# Patient Record
Sex: MALE | Race: WHITE
[De-identification: names, ages, dates, MRNs, and addresses within clinical notes are randomized per-mention and may not be internally consistent; named-entity substitution may affect disease eponyms.]

---

## 2020-07-28 ENCOUNTER — HOSPITAL ENCOUNTER (INPATIENT)
Dept: HOSPITAL 96 - M.ERS | Age: 35
LOS: 3 days | Discharge: HOME | DRG: 433 | End: 2020-07-31
Attending: INTERNAL MEDICINE | Admitting: INTERNAL MEDICINE
Payer: COMMERCIAL

## 2020-07-28 VITALS — WEIGHT: 204 LBS | BODY MASS INDEX: 26.18 KG/M2 | HEIGHT: 74.02 IN

## 2020-07-28 VITALS — DIASTOLIC BLOOD PRESSURE: 85 MMHG | SYSTOLIC BLOOD PRESSURE: 126 MMHG

## 2020-07-28 VITALS — DIASTOLIC BLOOD PRESSURE: 93 MMHG | SYSTOLIC BLOOD PRESSURE: 144 MMHG

## 2020-07-28 VITALS — SYSTOLIC BLOOD PRESSURE: 180 MMHG | DIASTOLIC BLOOD PRESSURE: 99 MMHG

## 2020-07-28 VITALS — SYSTOLIC BLOOD PRESSURE: 139 MMHG | DIASTOLIC BLOOD PRESSURE: 79 MMHG

## 2020-07-28 DIAGNOSIS — K70.31: ICD-10-CM

## 2020-07-28 DIAGNOSIS — K21.0: ICD-10-CM

## 2020-07-28 DIAGNOSIS — I85.00: ICD-10-CM

## 2020-07-28 DIAGNOSIS — F32.9: ICD-10-CM

## 2020-07-28 DIAGNOSIS — F41.9: ICD-10-CM

## 2020-07-28 DIAGNOSIS — Z79.899: ICD-10-CM

## 2020-07-28 DIAGNOSIS — K44.9: ICD-10-CM

## 2020-07-28 DIAGNOSIS — Z20.828: ICD-10-CM

## 2020-07-28 DIAGNOSIS — F17.210: ICD-10-CM

## 2020-07-28 DIAGNOSIS — F20.9: ICD-10-CM

## 2020-07-28 DIAGNOSIS — F43.10: ICD-10-CM

## 2020-07-28 DIAGNOSIS — E46: ICD-10-CM

## 2020-07-28 DIAGNOSIS — E87.6: ICD-10-CM

## 2020-07-28 DIAGNOSIS — K31.89: ICD-10-CM

## 2020-07-28 DIAGNOSIS — K70.11: Primary | ICD-10-CM

## 2020-07-28 DIAGNOSIS — K76.6: ICD-10-CM

## 2020-07-28 DIAGNOSIS — R16.2: ICD-10-CM

## 2020-07-28 DIAGNOSIS — E03.9: ICD-10-CM

## 2020-07-28 DIAGNOSIS — F10.10: ICD-10-CM

## 2020-07-28 LAB
ABSOLUTE BASOPHILS: 0.2 THOU/UL (ref 0–0.2)
ABSOLUTE EOSINOPHILS: 0.2 THOU/UL (ref 0–0.7)
ABSOLUTE MONOCYTES: 0.5 THOU/UL (ref 0–1.2)
ALBUMIN SERPL-MCNC: 2.3 G/DL (ref 3.4–5)
ALP SERPL-CCNC: 387 U/L (ref 46–116)
ALT SERPL-CCNC: 81 U/L (ref 30–65)
AMMONIA PLAS-SCNC: < 10 UMOL/L (ref 11–32)
ANION GAP SERPL CALC-SCNC: 9 MMOL/L (ref 7–16)
AST SERPL-CCNC: 260 U/L (ref 15–37)
BASOPHILS NFR BLD AUTO: 1.5 %
BENZODIAZ UR-MCNC: POSITIVE UG/L
BILIRUB SERPL-MCNC: 6.7 MG/DL
BILIRUB UR-MCNC: (no result) MG/DL
BUN SERPL-MCNC: 3 MG/DL (ref 7–18)
CALCIUM SERPL-MCNC: 8.4 MG/DL (ref 8.5–10.1)
CALCIUM SERPL-MCNC: 8.4 MG/DL (ref 8.5–10.1)
CHLORIDE SERPL-SCNC: 102 MMOL/L (ref 98–107)
CO2 SERPL-SCNC: 27 MMOL/L (ref 21–32)
COLOR UR: (no result)
CREAT SERPL-MCNC: 0.8 MG/DL (ref 0.6–1.3)
EOSINOPHIL NFR BLD: 1.8 %
GLUCOSE SERPL-MCNC: 163 MG/DL (ref 70–99)
GRANULOCYTES NFR BLD MANUAL: 73.8 %
HCT VFR BLD CALC: 40.6 % (ref 42–52)
HGB BLD-MCNC: 13.9 GM/DL (ref 14–18)
INR PPP: 1.2
KETONES UR STRIP-MCNC: NEGATIVE MG/DL
LYMPHOCYTES # BLD: 2.6 THOU/UL (ref 0.8–5.3)
LYMPHOCYTES NFR BLD AUTO: 19 %
MAGNESIUM SERPL-MCNC: 1.6 MG/DL (ref 1.8–2.4)
MCH RBC QN AUTO: 36.8 PG (ref 26–34)
MCHC RBC AUTO-ENTMCNC: 34.4 G/DL (ref 28–37)
MCV RBC: 107.2 FL (ref 80–100)
MONOCYTES NFR BLD: 3.9 %
MPV: 7.4 FL. (ref 7.2–11.1)
NEUTROPHILS # BLD: 10.1 THOU/UL (ref 1.6–8.1)
NT-PRO BRAIN NAT PEPTIDE: 89 PG/ML (ref ?–300)
NUCLEATED RBCS: 0 /100WBC
PHOSPHATE SERPL-MCNC: 2.4 MG/DL (ref 2.5–4.9)
PLATELET COUNT*: 322 THOU/UL (ref 150–400)
POTASSIUM SERPL-SCNC: 3 MMOL/L (ref 3.5–5.1)
PROT SERPL-MCNC: 6.3 G/DL (ref 6.4–8.2)
PROT UR QL STRIP: NEGATIVE
PROTHROMBIN TIME: 12.7 SECONDS (ref 9.2–11.5)
RBC # BLD AUTO: 3.78 MIL/UL (ref 4.5–6)
RBC # UR STRIP: NEGATIVE /UL
RDW-CV: 16.2 % (ref 10.5–14.5)
SODIUM SERPL-SCNC: 138 MMOL/L (ref 136–145)
SP GR UR STRIP: 1.01 (ref 1–1.03)
URINE CLARITY: CLEAR
URINE GLUCOSE-RANDOM: NEGATIVE
URINE LEUKOCYTES-REFLEX: NEGATIVE
URINE NITRITE-REFLEX: NEGATIVE
UROBILINOGEN UR STRIP-ACNC: >= 8 E.U./DL (ref 0.2–1)
WBC # BLD AUTO: 13.6 THOU/UL (ref 4–11)

## 2020-07-28 NOTE — PROC
[address]  [address]                    PROCEDURE REPORT              
_______________________________________________________________________________
 
Name:       LIS BARNES                Room:           [address]    ADM [place]  
M.R.#:  [id number]      Account #:      [id number]  
Admission:  07/28/20     Attend Phys:    Lis Moise MD 
Discharge:               Date of Birth:  [birth year]  
         Report #: [id number]
                                                                                
_______________________________________________________________________________
THIS REPORT FOR:  //name//                      
 
cc:  FAM - No family physician/PCP 
     FAM - No family physician/PCP                                        ~
THIS REPORT FOR:  //name//                      
 
For GI report, please see the Provation report in Perceptive 7 content.
 
 
 
 
 
 
 
 
 
 
 
 
 
 
 
 
 
 
 
 
 
 
 
 
 
 
 
 
 
 
 
 
 
 
 
 
 
                       
                                        By:                                
                 
D: 07/30/20     _______________________________________
T: 07/31/20 0652Medical Records Staff MARYLIN       /AL

## 2020-07-29 VITALS — SYSTOLIC BLOOD PRESSURE: 134 MMHG | DIASTOLIC BLOOD PRESSURE: 96 MMHG

## 2020-07-29 VITALS — DIASTOLIC BLOOD PRESSURE: 82 MMHG | SYSTOLIC BLOOD PRESSURE: 138 MMHG

## 2020-07-29 VITALS — SYSTOLIC BLOOD PRESSURE: 127 MMHG | DIASTOLIC BLOOD PRESSURE: 89 MMHG

## 2020-07-29 VITALS — SYSTOLIC BLOOD PRESSURE: 154 MMHG | DIASTOLIC BLOOD PRESSURE: 102 MMHG

## 2020-07-29 VITALS — DIASTOLIC BLOOD PRESSURE: 96 MMHG | SYSTOLIC BLOOD PRESSURE: 147 MMHG

## 2020-07-29 VITALS — SYSTOLIC BLOOD PRESSURE: 141 MMHG | DIASTOLIC BLOOD PRESSURE: 91 MMHG

## 2020-07-29 VITALS — DIASTOLIC BLOOD PRESSURE: 87 MMHG | SYSTOLIC BLOOD PRESSURE: 137 MMHG

## 2020-07-29 VITALS — SYSTOLIC BLOOD PRESSURE: 129 MMHG | DIASTOLIC BLOOD PRESSURE: 93 MMHG

## 2020-07-29 VITALS — DIASTOLIC BLOOD PRESSURE: 96 MMHG | SYSTOLIC BLOOD PRESSURE: 148 MMHG

## 2020-07-29 VITALS — DIASTOLIC BLOOD PRESSURE: 86 MMHG | SYSTOLIC BLOOD PRESSURE: 124 MMHG

## 2020-07-29 VITALS — DIASTOLIC BLOOD PRESSURE: 87 MMHG | SYSTOLIC BLOOD PRESSURE: 133 MMHG

## 2020-07-29 VITALS — SYSTOLIC BLOOD PRESSURE: 131 MMHG | DIASTOLIC BLOOD PRESSURE: 87 MMHG

## 2020-07-29 VITALS — DIASTOLIC BLOOD PRESSURE: 85 MMHG | SYSTOLIC BLOOD PRESSURE: 129 MMHG

## 2020-07-29 VITALS — DIASTOLIC BLOOD PRESSURE: 85 MMHG | SYSTOLIC BLOOD PRESSURE: 124 MMHG

## 2020-07-29 VITALS — SYSTOLIC BLOOD PRESSURE: 125 MMHG | DIASTOLIC BLOOD PRESSURE: 85 MMHG

## 2020-07-29 VITALS — SYSTOLIC BLOOD PRESSURE: 134 MMHG | DIASTOLIC BLOOD PRESSURE: 80 MMHG

## 2020-07-29 VITALS — SYSTOLIC BLOOD PRESSURE: 130 MMHG | DIASTOLIC BLOOD PRESSURE: 98 MMHG

## 2020-07-29 VITALS — DIASTOLIC BLOOD PRESSURE: 95 MMHG | SYSTOLIC BLOOD PRESSURE: 119 MMHG

## 2020-07-29 VITALS — SYSTOLIC BLOOD PRESSURE: 123 MMHG | DIASTOLIC BLOOD PRESSURE: 80 MMHG

## 2020-07-29 VITALS — SYSTOLIC BLOOD PRESSURE: 125 MMHG | DIASTOLIC BLOOD PRESSURE: 77 MMHG

## 2020-07-29 VITALS — SYSTOLIC BLOOD PRESSURE: 148 MMHG | DIASTOLIC BLOOD PRESSURE: 88 MMHG

## 2020-07-29 VITALS — DIASTOLIC BLOOD PRESSURE: 92 MMHG | SYSTOLIC BLOOD PRESSURE: 141 MMHG

## 2020-07-29 LAB
ALBUMIN SERPL-MCNC: 2.1 G/DL (ref 3.4–5)
ALP SERPL-CCNC: 366 U/L (ref 46–116)
ALT SERPL-CCNC: 68 U/L (ref 30–65)
ANION GAP SERPL CALC-SCNC: 8 MMOL/L (ref 7–16)
AST SERPL-CCNC: 204 U/L (ref 15–37)
BF LYMPHOCYTES: 58 %
BF MONOCYTES: 34 %
BF POLYS: 8 %
BF RBC: <1000 /MM3
BILIRUB SERPL-MCNC: 6 MG/DL
BUN SERPL-MCNC: 4 MG/DL (ref 7–18)
CALCIUM SERPL-MCNC: 7.6 MG/DL (ref 8.5–10.1)
CHLORIDE SERPL-SCNC: 105 MMOL/L (ref 98–107)
CLARITY UR: (no result)
CO2 SERPL-SCNC: 27 MMOL/L (ref 21–32)
CREAT SERPL-MCNC: 0.9 MG/DL (ref 0.6–1.3)
GLUCOSE SERPL-MCNC: 112 MG/DL (ref 70–99)
HCT VFR BLD CALC: 37.9 % (ref 42–52)
HGB BLD-MCNC: 13.1 GM/DL (ref 14–18)
INR PPP: 1.3
MAGNESIUM SERPL-MCNC: 1.9 MG/DL (ref 1.8–2.4)
MCH RBC QN AUTO: 37.3 PG (ref 26–34)
MCHC RBC AUTO-ENTMCNC: 34.4 G/DL (ref 28–37)
MCV RBC: 108.3 FL (ref 80–100)
MPV: 7.6 FL. (ref 7.2–11.1)
PHOSPHATE SERPL-MCNC: 2.1 MG/DL (ref 2.5–4.9)
PLATELET COUNT*: 260 THOU/UL (ref 150–400)
POTASSIUM SERPL-SCNC: 4 MMOL/L (ref 3.5–5.1)
PROT SERPL-MCNC: 5.9 G/DL (ref 6.4–8.2)
PROTHROMBIN TIME: 13.2 SECONDS (ref 9.2–11.5)
RBC # BLD AUTO: 3.5 MIL/UL (ref 4.5–6)
RDW-CV: 16.1 % (ref 10.5–14.5)
SODIUM SERPL-SCNC: 140 MMOL/L (ref 136–145)
SOURCE: (no result)
SPECIMEN VOL 24H UR: 2110 ML
TOTAL CELL COUNT: 372 /MM3
WBC # BLD AUTO: 13.1 THOU/UL (ref 4–11)

## 2020-07-29 PROCEDURE — 0W9G3ZZ DRAINAGE OF PERITONEAL CAVITY, PERCUTANEOUS APPROACH: ICD-10-PCS

## 2020-07-29 NOTE — EKG
[place] [address]
Phone:  [phone]                     ELECTROCARDIOGRAM REPORT      
_______________________________________________________________________________
 
Name:         MOLLYLIS               Room:          [address]    ADM IN 
M.R.#:    [id number]     Account #:     [id number]  
Admission:    20    Attend Phys:   Lis Moise, 
Discharge:                Date of Birth: [birth year]  
Date of Service: 20 1729  Report #:      [id number]
        [id number]
_______________________________________________________________________________
THIS REPORT FOR:  //name//                      
 
                         [facility] ED
                                       
Test Date:    2020               Test Time:    17:29:38
Pat Name:     LIS BARNES          Department:   
Patient ID:   [id number]            Room:         [facility]
Gender:       M                        Technician:   CCD
:          1985               Requested By: Laura Thompson
Order Number: [id number] MD:   Reid Mas
                                 Measurements
Intervals                              Axis          
Rate:         115                      P:            57
ND:           146                      QRS:          47
QRSD:         87                       T:            -7
QT:           374                                    
QTc:          518                                    
                           Interpretive Statements
Sinus tachycardia
Borderline repol abnormality, diffuse leads
Prolonged QT interval
No previous ECG available for comparison
Electronically Signed On 2020 10:37:00 CDT by Reid Mas
[email or web address]
 
 
 
 
 
 
 
 
 
 
 
 
 
 
 
 
 
 
 
 
  <ELECTRONICALLY SIGNED>
                                           By: Reid Mas MD, FACC     
  20     1037
D: 20 1729   _____________________________________
T: 20 1729   Reid Mas MD, [facility]       /EPI

## 2020-07-29 NOTE — NUR
RECEIVED REPORT AND ASSUMED CARE AT 2110. PT TRANSPORTED FROM ED TO ICU BED 3.
PT ABLE TO AMBULATE TO BED, ON ROOM AIR. ASSESSMENT AND ADMISSION COMPLETED.
PT DENIES COMPLAINTS OF PAIN, ONLY "DISCOMFORT" R/T ASCITES. REPOSTIONS SELF
FOR COMFORT. PT APPEARS VERY ANXIOUS, DISCUSSED PLAN OF CARE, VERBALIZED
UNDERSTANDING. ORIENTATED TO ROOM, CALL LIGHT, FALL POLICY. NPO/ GI CONSULT.
LAST DRINK WAS 7/27/20. PT REPORTS DRINKING A PINT TO FIFTH OF VODKA DAILY.
REPORTS HIS DRINKING IS HIS COPING FOR HIS ANXIETY. REPORTS PREVIOUSLY TAKING
MEDICATION FOR ANXIETY (KLONOPIN).THEN DID NOT HAVE INSURANCE AND UNABLE TO
CONTINUE HAVING THE PRESCRIPTION FILLED.
BED LOCKED IN LOWEST POSITION, CALL LIGHT WITHIN REACH.

## 2020-07-29 NOTE — NUR
ASSESSMENT AS CHARTED. VSS THROUGHOUT SHIFT. PERACENTESIS TODAY WITH REMOVAL
OF 2110 OF FLUID. LAST CIWA WAS 2 AT 1600. PATIENT APPEARS MORE AT EASE. UP
SBA IN ROOM. NO COMPLAINTS OF PAIN.

## 2020-07-29 NOTE — NUR
ICU rounds: Pt out of the room for paracentesis. Plan EGD tomorrow. Watching
for withdrawals. Tele psych consult pending for anxiety. [facility] to screen
for [facility]. Pt will need PCP, CM to provide info for safety net clinics, as
well as ETOH tx info. CM to attempt to assess tomorrow.

## 2020-07-30 VITALS — SYSTOLIC BLOOD PRESSURE: 144 MMHG | DIASTOLIC BLOOD PRESSURE: 86 MMHG

## 2020-07-30 VITALS — SYSTOLIC BLOOD PRESSURE: 131 MMHG | DIASTOLIC BLOOD PRESSURE: 77 MMHG

## 2020-07-30 VITALS — DIASTOLIC BLOOD PRESSURE: 84 MMHG | SYSTOLIC BLOOD PRESSURE: 132 MMHG

## 2020-07-30 VITALS — SYSTOLIC BLOOD PRESSURE: 127 MMHG | DIASTOLIC BLOOD PRESSURE: 89 MMHG

## 2020-07-30 VITALS — SYSTOLIC BLOOD PRESSURE: 128 MMHG | DIASTOLIC BLOOD PRESSURE: 75 MMHG

## 2020-07-30 VITALS — DIASTOLIC BLOOD PRESSURE: 84 MMHG | SYSTOLIC BLOOD PRESSURE: 133 MMHG

## 2020-07-30 VITALS — DIASTOLIC BLOOD PRESSURE: 94 MMHG | SYSTOLIC BLOOD PRESSURE: 146 MMHG

## 2020-07-30 VITALS — DIASTOLIC BLOOD PRESSURE: 91 MMHG | SYSTOLIC BLOOD PRESSURE: 121 MMHG

## 2020-07-30 LAB
ANION GAP SERPL CALC-SCNC: 8 MMOL/L (ref 7–16)
BUN SERPL-MCNC: 5 MG/DL (ref 7–18)
CALCIUM SERPL-MCNC: 7.8 MG/DL (ref 8.5–10.1)
CHLORIDE SERPL-SCNC: 105 MMOL/L (ref 98–107)
CO2 SERPL-SCNC: 25 MMOL/L (ref 21–32)
CREAT SERPL-MCNC: 0.8 MG/DL (ref 0.6–1.3)
GLUCOSE SERPL-MCNC: 102 MG/DL (ref 70–99)
MAGNESIUM SERPL-MCNC: 1.5 MG/DL (ref 1.8–2.4)
PHOSPHATE SERPL-MCNC: 2.2 MG/DL (ref 2.5–4.9)
POTASSIUM SERPL-SCNC: 3.5 MMOL/L (ref 3.5–5.1)
SODIUM SERPL-SCNC: 138 MMOL/L (ref 136–145)

## 2020-07-30 PROCEDURE — 0DJ08ZZ INSPECTION OF UPPER INTESTINAL TRACT, VIA NATURAL OR ARTIFICIAL OPENING ENDOSCOPIC: ICD-10-PCS | Performed by: INTERNAL MEDICINE

## 2020-07-30 NOTE — NUR
ICU rounds: Liver failure, instructed to stop drinking. Para yesterday. EGD
today, gastritis and varices, will need yearly colon. Pt resides at home with
his grandparents and dad. Independent. No DME. No hx of HH or SNF. Pt states
that he will be getting disability and [facility] in 2 months, [facility]
following.

## 2020-07-30 NOTE — NUR
PT. PROGRESSING TOWARDS GOALS.  CIWA 3 DUE TO ANXIETY.  DENIES HALLUCINATIONS.
 NPO SINCE MIDNIGHT FOR EGD TODAY.  DENIED PAIN THROUGHOUT SHIFT.  SINUS
RHYTHM ON MONITOR.  ROOM AIR.  CALL LIGHT IN REACH, WILL CONTINUE TO MONITOR.

## 2020-07-30 NOTE — NUR
PT ADVANCED TOWARD GOALS DOWNGRADED TO TELEY STATUS C/O AGITATION AND ANXIETY
CIWA RANGE FROM 6-8 HAD EGD COMPLETED VARICES FOUND BUT NOT BIG ENOUGH TO BAND
YET CASE MGMT HAS BEENING TALKING WITH PT ABOUT REHAB AND DISABILITY

## 2020-07-31 VITALS — SYSTOLIC BLOOD PRESSURE: 123 MMHG | DIASTOLIC BLOOD PRESSURE: 81 MMHG

## 2020-07-31 VITALS — DIASTOLIC BLOOD PRESSURE: 91 MMHG | SYSTOLIC BLOOD PRESSURE: 122 MMHG

## 2020-07-31 VITALS — SYSTOLIC BLOOD PRESSURE: 131 MMHG | DIASTOLIC BLOOD PRESSURE: 84 MMHG

## 2020-07-31 VITALS — DIASTOLIC BLOOD PRESSURE: 81 MMHG | SYSTOLIC BLOOD PRESSURE: 123 MMHG

## 2020-07-31 VITALS — DIASTOLIC BLOOD PRESSURE: 87 MMHG | SYSTOLIC BLOOD PRESSURE: 139 MMHG

## 2020-07-31 LAB
ABSOLUTE BASOPHILS: 0.2 THOU/UL (ref 0–0.2)
ABSOLUTE EOSINOPHILS: 0.2 THOU/UL (ref 0–0.7)
ABSOLUTE MONOCYTES: 0.5 THOU/UL (ref 0–1.2)
ALBUMIN SERPL-MCNC: 2.1 G/DL (ref 3.4–5)
ALP SERPL-CCNC: 320 U/L (ref 46–116)
ALT SERPL-CCNC: 44 U/L (ref 30–65)
ANION GAP SERPL CALC-SCNC: 6 MMOL/L (ref 7–16)
AST SERPL-CCNC: 95 U/L (ref 15–37)
BASOPHILS NFR BLD AUTO: 1.4 %
BILIRUB SERPL-MCNC: 6 MG/DL
BUN SERPL-MCNC: 5 MG/DL (ref 7–18)
CALCIUM SERPL-MCNC: 7.9 MG/DL (ref 8.5–10.1)
CHLORIDE SERPL-SCNC: 105 MMOL/L (ref 98–107)
CO2 SERPL-SCNC: 25 MMOL/L (ref 21–32)
CREAT SERPL-MCNC: 0.8 MG/DL (ref 0.6–1.3)
EOSINOPHIL NFR BLD: 1.7 %
GLUCOSE SERPL-MCNC: 105 MG/DL (ref 70–99)
GRANULOCYTES NFR BLD MANUAL: 81.3 %
HCT VFR BLD CALC: 38.5 % (ref 42–52)
HGB BLD-MCNC: 13.3 GM/DL (ref 14–18)
INR PPP: 1.4
LYMPHOCYTES # BLD: 1.3 THOU/UL (ref 0.8–5.3)
LYMPHOCYTES NFR BLD AUTO: 11.5 %
MCH RBC QN AUTO: 37.1 PG (ref 26–34)
MCHC RBC AUTO-ENTMCNC: 34.6 G/DL (ref 28–37)
MCV RBC: 107.1 FL (ref 80–100)
MONOCYTES NFR BLD: 4.1 %
MPV: 7.7 FL. (ref 7.2–11.1)
NEUTROPHILS # BLD: 9.4 THOU/UL (ref 1.6–8.1)
NUCLEATED RBCS: 0 /100WBC
PLATELET COUNT*: 203 THOU/UL (ref 150–400)
POTASSIUM SERPL-SCNC: 4.1 MMOL/L (ref 3.5–5.1)
PROT SERPL-MCNC: 5.8 G/DL (ref 6.4–8.2)
PROTHROMBIN TIME: 14.4 SECONDS (ref 9.2–11.5)
RBC # BLD AUTO: 3.59 MIL/UL (ref 4.5–6)
RDW-CV: 15.4 % (ref 10.5–14.5)
SODIUM SERPL-SCNC: 136 MMOL/L (ref 136–145)
WBC # BLD AUTO: 11.6 THOU/UL (ref 4–11)

## 2020-07-31 NOTE — CON
[address]  [address]                    CONSULTATION                  
_______________________________________________________________________________
 
Name:       LIS BARNES                Room:           [address] [place]  
M.R.#:  [id number]      Account #:      [id number]  
Admission:  07/28/20     Attend Phys:    Lis Moise MD 
Discharge:               Date of Birth:  [birth year]  
         Report #: [id number]
                                                                     [id number]  
_______________________________________________________________________________
THIS REPORT FOR:  //name//                      
 
cc:  BRYAN Davis family physician/PCP 
     BRYAN - No family physician/PCP                                        ~
THIS REPORT FOR:  //name//                      
 
CC: [facility] physician/PCP
    Lis Moise
 
DICTATED BY: Radha Chaparro [facility]
 
DATE OF SERVICE:  07/29/2020
 
 
The patient does not have a PCP.
 
Please note at the time of this dictation, the patient was seen and physically
examined by myself.
 
REASON FOR CONSULTATION:  Abdominal pain and distention, ascites.
 
HISTORY OF PRESENT ILLNESS:  This is a 35-year-old male who presented to the
Emergency Room with worsening of his abdominal pain, abdominal distention and
swelling in his feet and ankles.  He states he occasionally have sharp pains. 
He denied any increased shortness of breath with any of this.  He states
symptoms started approximately 2 weeks ago.  He states he has been wearing
elastic short, so he had noticed button was too tight and he does not weigh
himself on a daily basis.  The patient does have a history of alcohol abuse.  He
drinks anywhere from a pint to a fifth of vodka daily, which he states he does
this to help calm his anxiety.  He does not take any medications for his anxiety
or has not sought out any psychological evaluation to help with this as well. 
He does state he is trying to get on disability due to his anxiety.  The patient
denies any nausea or vomiting.  He does complain of acid reflux on occasion,
especially after he has been drinking and goes to lay down in bed.  He will have
to take some Tums for this.  He states his bowels move daily 2-3 times a day
after eating.  He has not noticed any weight loss at this time.
 
ALLERGIES:  No known drug allergies.
 
No medications from home.
 
PAST MEDICAL HISTORY:  Anxiety, depression, PTSD, schizophrenia.
 
PAST SURGICAL HISTORY:  Negative.
 
FAMILY HISTORY:  Negative for any GI or female cancers.
 
 
 
[place] [address]                    CONSULTATION                  
_______________________________________________________________________________
 
Name:       LIS BARNES                Room:           [address] [place]  
[facility]#:  [id number]      Account #:      [id number]  
Admission:  07/28/20     Attend Phys:    Lis Moise MD 
Discharge:               Date of Birth:  [birth year]  
         Report #: [id number]
                                                                     [id number]  
_______________________________________________________________________________
 
SOCIAL HISTORY:  He does smoke about 5 cigarettes a day.  He has a daily drinker
1 pint to one fifth of vodka daily and he says he has a remote history of
illegal drug use in the past.
 
REVIEW OF SYSTEMS:  Twelve-point review of systems is essentially negative
except what is mentioned in the HPI.
 
PHYSICAL EXAMINATION:
VITAL SIGNS:  Temperature 36.8, pulse 100, respirations 27, blood pressure
129/93.
HEART:  Tachycardic, but regular rate and rhythm.
LUNGS:  Diminished, but clear.
ABDOMEN:  Very distended and taut with fluid wave noted.  Positive bowel sounds
and some tenderness noted throughout the entire abdomen.  No swelling noted in
the feet or ankles.
 
LABORATORY DATA:  Hemoglobin 13.1, white count 13.1, platelets 260.  GFR is 96. 
TSH is 4.2.  Admission total bilirubin was 6.7, it is down to 6.  Alkaline
phosphatase was 387, it is down to 366.  ALT was 81, down to 68.  AST was 260,
down to 204.  Blood alcohol level was 186.  TSH was 4.2.  CT of the abdomen and
pelvis showed mild thickening of the wall of the esophagus with hepato and
splenomegaly and mild ascites noted.  PT 13.2, INR was 1.3.  Child-Pascual was 11,
MELD 18 and discriminate function 10.
 
IMPRESSION:
1.  Alcoholic hepatitis, discriminant function of 10.
2.  Ascites.
3.  Abnormal CT thickening of the esophageal wall.
4.  Gastroesophageal reflux disease.
5.  Elevated liver function tests.
6.  Alcohol abuse secondary to anxiety, drinking a pint to one fifth of vodka
daily.
7.  Hypothyroidism.
 
PLAN:
1.  Labs pending.  AFP, acute hepatitis panel.
2.  Paracentesis today with cell count, cytology, albumin and total protein on
the fluid.  If greater than 500 mL removed to replace with some salt-poor
albumin.
3.  Ultrasound with Dopplers of the abdomen.
4.  EGD tomorrow with Dr. Campo.
5.  Discussed with father and the patient about the need to stop drinking.
6.  The patient would benefit from a full psych evaluation to help with his
anxiety.
 
 
 
 
[address] [place]  [address]                    CONSULTATION                  
_______________________________________________________________________________
 
Name:       LIS BARNES                Room:           [address] [place]  
M.R.#:  [id number]      Account #:      [id number]  
Admission:  07/28/20     Attend Phys:    Lis Moise MD 
Discharge:               Date of Birth:  [birth year]  
         Report #: [id number]
                                                                     [id number]  
_______________________________________________________________________________
Thank you for allowing us to participate in this patient's care.  Please do not
hesitate to call with any questions in regard to this consult.
 
Agree with above assessment and plan by Radha Chaparro
 
 
 
 
 
 
 
 
 
 
 
 
 
 
 
 
 
 
 
 
 
 
 
 
 
 
 
 
 
 
 
 
 
 
 
 
 
 
 
 
<ELECTRONICALLY SIGNED>
                                        By:  Ramon Campo MD         
07/31/20     1123
D: 07/29/20 1026_______________________________________
T: 07/29/20 1107Ramon Campo MD            /nt

## 2020-07-31 NOTE — PATH
[address]  [address]                    PATHOLOGY RPT PROCEDURE       
_______________________________________________________________________________
 
Name:       LIS BARNES                Room:           [address] [place]  
..#:  [id number]      Account #:      [id number]  
Admission:  07/28/20     Date of Birth:  [birth year]  
Discharge:                             Report #:    [id number]
                                                         Path Case #: [id number]
_______________________________________________________________________________
Note
[facility] Accession Number: [id number]
   TESTS               RESULT  FLAG  UNITS    REF RANGE  LAB
------------------------------------------------------------
   Clinician Provided Cytology Information
   No. of containers..01 Other (Miscellaneous)
Source:                                                   01
   ASCITES
DIAGNOSIS:                                                02
   ASCITES
   NEGATIVE FOR MALIGNANT CELLS.
   COMMENT,
   REACTIVE MESOTHELIAL CELLS AND INFLAMMATORY CELLS PRESENT.
   SUGGEST CLINICAL CORRELATION.
Signed out by:                                            02
   Ephraim Wilkes MD, Pathologist
   NPI- [id number]
Performed by:                                             01
   Moriah Vernon, Cytotechnologist ([facility])
Gross description:                                        01
   55 ML, CLOUDY YELLOW, 1 TP 1 CB
   /LCS  07/30/2020  0733 Local
 
------------------------------------------------------------
    FLAG LEGEND:
    L-Low Normal,H-High Normal,LL-Alert Low,HH-Alert High
    <-Panic Low,>-Panic High,A-Abnormal,AA-Critical Abnormal
------------------------------------------------------------
 
Performed at:
[address]
   [place]  [address]
   Deandre Milian MD, Phone: [phone]
[address] [place] [address]  [address]
   Spencer Kerley MD, Phone: [phone]
Specimen Comment: A courtesy copy of this report has been sent to [phone]
[phone]
Specimen Comment: [phone]
Specimen Comment: Report sent to DR BRAGG / DR CHAN
Specimen Comment: A duplicate report has been generated due to demographic
updates.
***Performed at:  01
   [address], [place], [place]  [id number]
   MD Deandre Milian MD Phone:  [phone]

## 2020-07-31 NOTE — NUR
pt discharged transported via wheelchir picked up by family discharge packet
scripts and belonings sent with patient

## 2020-07-31 NOTE — NUR
PT. PROGRESSING TOWARDS GOALS.  CIWA 7 DUE TO ANXIETY, SWEATS, AND AND MILD
TREMORS.  ALERT AND ORIENTED, PT. ANXIOUS BUT READY TO GO HOME AND HAS STATED
HE IS DETERMINED TO STAY SOBER. IVF REMAIN INFUSING.  ATIVAN GIVEN X2 DOSES
PER PRN ORDER. PT. UP TO BSC/URINAL.  EGD COMPLETED YESTERDAY WITH NO
INTERVENTION.  CALL LIGHT IN REACH, WILL CONTINUE TO MONITOR.

## 2020-08-01 LAB — SOURCE: (no result)

## 2020-08-04 LAB — PROT FLD-MCNC: 2.2 G/DL

## 2020-10-16 ENCOUNTER — HOSPITAL ENCOUNTER (INPATIENT)
Dept: HOSPITAL 96 - M.ERS | Age: 35
LOS: 5 days | Discharge: HOME | DRG: 432 | End: 2020-10-21
Attending: INTERNAL MEDICINE | Admitting: INTERNAL MEDICINE
Payer: MEDICAID

## 2020-10-16 VITALS — SYSTOLIC BLOOD PRESSURE: 134 MMHG | DIASTOLIC BLOOD PRESSURE: 82 MMHG

## 2020-10-16 VITALS — BODY MASS INDEX: 27.66 KG/M2 | WEIGHT: 215.5 LBS | HEIGHT: 74.02 IN

## 2020-10-16 VITALS — SYSTOLIC BLOOD PRESSURE: 128 MMHG | DIASTOLIC BLOOD PRESSURE: 83 MMHG

## 2020-10-16 VITALS — DIASTOLIC BLOOD PRESSURE: 95 MMHG | SYSTOLIC BLOOD PRESSURE: 146 MMHG

## 2020-10-16 VITALS — SYSTOLIC BLOOD PRESSURE: 125 MMHG | DIASTOLIC BLOOD PRESSURE: 72 MMHG

## 2020-10-16 DIAGNOSIS — E87.1: ICD-10-CM

## 2020-10-16 DIAGNOSIS — R65.10: ICD-10-CM

## 2020-10-16 DIAGNOSIS — K59.00: ICD-10-CM

## 2020-10-16 DIAGNOSIS — K70.10: Primary | ICD-10-CM

## 2020-10-16 DIAGNOSIS — Z79.899: ICD-10-CM

## 2020-10-16 DIAGNOSIS — K70.30: ICD-10-CM

## 2020-10-16 DIAGNOSIS — Z28.21: ICD-10-CM

## 2020-10-16 DIAGNOSIS — E87.6: ICD-10-CM

## 2020-10-16 DIAGNOSIS — D62: ICD-10-CM

## 2020-10-16 DIAGNOSIS — F32.9: ICD-10-CM

## 2020-10-16 DIAGNOSIS — I85.01: ICD-10-CM

## 2020-10-16 DIAGNOSIS — F20.9: ICD-10-CM

## 2020-10-16 DIAGNOSIS — F10.230: ICD-10-CM

## 2020-10-16 DIAGNOSIS — F41.9: ICD-10-CM

## 2020-10-16 DIAGNOSIS — Z20.828: ICD-10-CM

## 2020-10-16 DIAGNOSIS — F17.210: ICD-10-CM

## 2020-10-16 LAB
%HYPO/RBC NFR BLD AUTO: (no result) %
ABSOLUTE MONOCYTES: 0.6 THOU/UL (ref 0–1.2)
ALBUMIN SERPL-MCNC: 3.4 G/DL (ref 3.4–5)
ALP SERPL-CCNC: 310 U/L (ref 46–116)
ALT SERPL-CCNC: 72 U/L (ref 30–65)
ANION GAP SERPL CALC-SCNC: 17 MMOL/L (ref 7–16)
APTT BLD: 26.9 SECONDS (ref 25–31.3)
AST SERPL-CCNC: 195 U/L (ref 15–37)
BILIRUB SERPL-MCNC: 5.3 MG/DL
BILIRUB UR-MCNC: (no result) MG/DL
BUN SERPL-MCNC: 14 MG/DL (ref 7–18)
CALCIUM SERPL-MCNC: 8.3 MG/DL (ref 8.5–10.1)
CHLORIDE SERPL-SCNC: 87 MMOL/L (ref 98–107)
CO2 SERPL-SCNC: 23 MMOL/L (ref 21–32)
COLOR UR: (no result)
CREAT SERPL-MCNC: 0.9 MG/DL (ref 0.6–1.3)
GLUCOSE SERPL-MCNC: 148 MG/DL (ref 70–99)
GRANULOCYTES NFR BLD MANUAL: 75 %
HCT VFR BLD CALC: 26.7 % (ref 42–52)
HCT VFR BLD CALC: 34.3 % (ref 42–52)
HGB BLD-MCNC: 11.9 GM/DL (ref 14–18)
HGB BLD-MCNC: 9.3 GM/DL (ref 14–18)
INR PPP: 1.3
KETONES UR STRIP-MCNC: (no result) MG/DL
LIPASE: 72 U/L (ref 73–393)
LYMPHOCYTES # BLD: 2.1 THOU/UL (ref 0.8–5.3)
LYMPHOCYTES NFR BLD AUTO: 14 %
MCH RBC QN AUTO: 33.1 PG (ref 26–34)
MCHC RBC AUTO-ENTMCNC: 34.8 G/DL (ref 28–37)
MCV RBC: 95.2 FL (ref 80–100)
MONOCYTES NFR BLD: 4 %
MPV: 7.3 FL. (ref 7.2–11.1)
NEUTROPHILS # BLD: 12.5 THOU/UL (ref 1.6–8.1)
NEUTS BAND NFR BLD: 7 %
NUCLEATED RBCS: 0 /100WBC
PLATELET # BLD EST: ADEQUATE 10*3/UL
PLATELET COUNT*: 176 THOU/UL (ref 150–400)
POTASSIUM SERPL-SCNC: 3.4 MMOL/L (ref 3.5–5.1)
PROT SERPL-MCNC: 7.5 G/DL (ref 6.4–8.2)
PROT UR QL STRIP: (no result)
PROTHROMBIN TIME: 13.7 SECONDS (ref 9.2–11.5)
RBC # BLD AUTO: 3.6 MIL/UL (ref 4.5–6)
RBC # UR STRIP: NEGATIVE /UL
RDW-CV: 16 % (ref 10.5–14.5)
SODIUM SERPL-SCNC: 127 MMOL/L (ref 136–145)
SP GR UR STRIP: >= 1.03 (ref 1–1.03)
TARGETS BLD QL SMEAR: (no result)
URINE CLARITY: CLEAR
URINE GLUCOSE-RANDOM: NEGATIVE
URINE LEUKOCYTES-REFLEX: NEGATIVE
URINE NITRITE-REFLEX: NEGATIVE
UROBILINOGEN UR STRIP-ACNC: 0.2 E.U./DL (ref 0.2–1)
WBC # BLD AUTO: 15.2 THOU/UL (ref 4–11)

## 2020-10-16 PROCEDURE — 06L38CZ OCCLUSION OF ESOPHAGEAL VEIN WITH EXTRALUMINAL DEVICE, VIA NATURAL OR ARTIFICIAL OPENING ENDOSCOPIC: ICD-10-PCS | Performed by: INTERNAL MEDICINE

## 2020-10-16 NOTE — EKG
[place] [address]
Phone:  [phone]                     ELECTROCARDIOGRAM REPORT      
_______________________________________________________________________________
 
Name:         MOLLYLIS               Room:          [place] [address]   ADM [place] 
.R.#:    [id number]     Account #:     [id number]  
Admission:    10/16/20    Attend Phys:   Lis Moise, 
Discharge:                Date of Birth: [birth year]  
Date of Service: 10/16/20 [facility]  Report #:      [id number]
        [id number]
_______________________________________________________________________________
THIS REPORT FOR:  //name//                      
 
                         [facility] ED
                                       
Test Date:    2020-10-16               Test Time:    10:07:08
Pat Name:     LIS BARNES          Department:   
Patient ID:   [id number]            Room:         [facility]
Gender:       M                        Technician:   WESTLEY
:          1985               Requested By: Saúl Erazo
Order Number: [id number] MD:   Reymundo Truong
                                 Measurements
Intervals                              Axis          
Rate:         130                      P:            59
MO:           117                      QRS:          44
QRSD:         73                       T:            -44
QT:           374                                    
QTc:          550                                    
                           Interpretive Statements
Sinus tachycardia
artifact noted
nonspecific st segment changes noted
Borderline repolarization abnormality
Prolonged QT interval
Compared to ECG 2020 17:29:38
No significant changes
Electronically Signed On 10- 16:19:16 CDT by Reymundo Truong
[email or web address]
 
 
 
 
 
 
 
 
 
 
 
 
 
 
 
 
 
  <ELECTRONICALLY SIGNED>
                                           By: Reymundo Truong MD, FACC      
  10/16/20     1619
D: 10/16/20 1007   _____________________________________
T: 10/16/20 1007   Reymundo Truong MD, [facility]        /EPI

## 2020-10-16 NOTE — PROC
[address]  [address]                    PROCEDURE REPORT              
_______________________________________________________________________________
 
Name:       LIS BARNES                Room:           [address] [place]  
M.R.#:  [id number]      Account #:      [id number]  
Admission:  10/16/20     Attend Phys:    Lis Moise MD 
Discharge:               Date of Birth:  [birth year]  
         Report #: [id number]
                                                                                
_______________________________________________________________________________
THIS REPORT FOR:  //name//                      
 
cc:  FAM - No family physician/PCP 
     FAM - No family physician/PCP                                        ~
THIS REPORT FOR:  //name//                      
 
For GI report, please see the Provation report in Perceptive 7 content.
 
 
 
 
 
 
 
 
 
 
 
 
 
 
 
 
 
 
 
 
 
 
 
 
 
 
 
 
 
 
 
 
 
 
 
 
 
                       
                                        By:                                
                 
D: 10/16/20     _______________________________________
T: 10/21/20 0659Medical Records Staff MARYLIN       /AL

## 2020-10-17 VITALS — SYSTOLIC BLOOD PRESSURE: 94 MMHG | DIASTOLIC BLOOD PRESSURE: 57 MMHG

## 2020-10-17 VITALS — DIASTOLIC BLOOD PRESSURE: 83 MMHG | SYSTOLIC BLOOD PRESSURE: 151 MMHG

## 2020-10-17 VITALS — SYSTOLIC BLOOD PRESSURE: 130 MMHG | DIASTOLIC BLOOD PRESSURE: 74 MMHG

## 2020-10-17 VITALS — DIASTOLIC BLOOD PRESSURE: 70 MMHG | SYSTOLIC BLOOD PRESSURE: 121 MMHG

## 2020-10-17 VITALS — DIASTOLIC BLOOD PRESSURE: 81 MMHG | SYSTOLIC BLOOD PRESSURE: 133 MMHG

## 2020-10-17 VITALS — SYSTOLIC BLOOD PRESSURE: 111 MMHG | DIASTOLIC BLOOD PRESSURE: 62 MMHG

## 2020-10-17 LAB
ALBUMIN SERPL-MCNC: 2.6 G/DL (ref 3.4–5)
ALP SERPL-CCNC: 207 U/L (ref 46–116)
ALT SERPL-CCNC: 51 U/L (ref 30–65)
ANION GAP SERPL CALC-SCNC: 6 MMOL/L (ref 7–16)
AST SERPL-CCNC: 158 U/L (ref 15–37)
BILIRUB SERPL-MCNC: 5.2 MG/DL
BUN SERPL-MCNC: 14 MG/DL (ref 7–18)
CALCIUM SERPL-MCNC: 7.6 MG/DL (ref 8.5–10.1)
CHLORIDE SERPL-SCNC: 99 MMOL/L (ref 98–107)
CO2 SERPL-SCNC: 27 MMOL/L (ref 21–32)
CREAT SERPL-MCNC: 0.9 MG/DL (ref 0.6–1.3)
GLUCOSE SERPL-MCNC: 139 MG/DL (ref 70–99)
HCT VFR BLD CALC: 22.7 % (ref 42–52)
HCT VFR BLD CALC: 23.6 % (ref 42–52)
HGB BLD-MCNC: 8 GM/DL (ref 14–18)
HGB BLD-MCNC: 8.4 GM/DL (ref 14–18)
INR PPP: 1.3
MAGNESIUM SERPL-MCNC: 1.2 MG/DL (ref 1.8–2.4)
MCH RBC QN AUTO: 34.2 PG (ref 26–34)
MCHC RBC AUTO-ENTMCNC: 35.4 G/DL (ref 28–37)
MCV RBC: 96.6 FL (ref 80–100)
MPV: 8.2 FL. (ref 7.2–11.1)
PLATELET COUNT*: 90 THOU/UL (ref 150–400)
POTASSIUM SERPL-SCNC: 3.8 MMOL/L (ref 3.5–5.1)
PROT SERPL-MCNC: 5.9 G/DL (ref 6.4–8.2)
PROTHROMBIN TIME: 13.5 SECONDS (ref 9.2–11.5)
RBC # BLD AUTO: 2.45 MIL/UL (ref 4.5–6)
RDW-CV: 16.2 % (ref 10.5–14.5)
SODIUM SERPL-SCNC: 132 MMOL/L (ref 136–145)
WBC # BLD AUTO: 10.3 THOU/UL (ref 4–11)

## 2020-10-18 VITALS — SYSTOLIC BLOOD PRESSURE: 128 MMHG | DIASTOLIC BLOOD PRESSURE: 73 MMHG

## 2020-10-18 VITALS — SYSTOLIC BLOOD PRESSURE: 98 MMHG | DIASTOLIC BLOOD PRESSURE: 63 MMHG

## 2020-10-18 VITALS — SYSTOLIC BLOOD PRESSURE: 106 MMHG | DIASTOLIC BLOOD PRESSURE: 71 MMHG

## 2020-10-18 VITALS — DIASTOLIC BLOOD PRESSURE: 70 MMHG | SYSTOLIC BLOOD PRESSURE: 120 MMHG

## 2020-10-18 VITALS — SYSTOLIC BLOOD PRESSURE: 112 MMHG | DIASTOLIC BLOOD PRESSURE: 70 MMHG

## 2020-10-18 VITALS — SYSTOLIC BLOOD PRESSURE: 135 MMHG | DIASTOLIC BLOOD PRESSURE: 79 MMHG

## 2020-10-18 LAB
ALBUMIN SERPL-MCNC: 2.5 G/DL (ref 3.4–5)
ALP SERPL-CCNC: 191 U/L (ref 46–116)
ALT SERPL-CCNC: 59 U/L (ref 30–65)
ANION GAP SERPL CALC-SCNC: 4 MMOL/L (ref 7–16)
AST SERPL-CCNC: 219 U/L (ref 15–37)
BILIRUB SERPL-MCNC: 5.2 MG/DL
BUN SERPL-MCNC: 10 MG/DL (ref 7–18)
CALCIUM SERPL-MCNC: 7.6 MG/DL (ref 8.5–10.1)
CHLORIDE SERPL-SCNC: 104 MMOL/L (ref 98–107)
CO2 SERPL-SCNC: 29 MMOL/L (ref 21–32)
CREAT SERPL-MCNC: 0.8 MG/DL (ref 0.6–1.3)
GLUCOSE SERPL-MCNC: 133 MG/DL (ref 70–99)
HCT VFR BLD CALC: 23 % (ref 42–52)
HGB BLD-MCNC: 8 GM/DL (ref 14–18)
INR PPP: 1.4
MAGNESIUM SERPL-MCNC: 1.9 MG/DL (ref 1.8–2.4)
MCH RBC QN AUTO: 34.2 PG (ref 26–34)
MCHC RBC AUTO-ENTMCNC: 34.8 G/DL (ref 28–37)
MCV RBC: 98.1 FL (ref 80–100)
MPV: 8.1 FL. (ref 7.2–11.1)
PLATELET COUNT*: 94 THOU/UL (ref 150–400)
POTASSIUM SERPL-SCNC: 3.3 MMOL/L (ref 3.5–5.1)
PROT SERPL-MCNC: 5.5 G/DL (ref 6.4–8.2)
PROTHROMBIN TIME: 14 SECONDS (ref 9.2–11.5)
RBC # BLD AUTO: 2.34 MIL/UL (ref 4.5–6)
RDW-CV: 16.1 % (ref 10.5–14.5)
SODIUM SERPL-SCNC: 137 MMOL/L (ref 136–145)
WBC # BLD AUTO: 7.4 THOU/UL (ref 4–11)

## 2020-10-19 VITALS — DIASTOLIC BLOOD PRESSURE: 81 MMHG | SYSTOLIC BLOOD PRESSURE: 125 MMHG

## 2020-10-19 VITALS — DIASTOLIC BLOOD PRESSURE: 64 MMHG | SYSTOLIC BLOOD PRESSURE: 133 MMHG

## 2020-10-19 VITALS — DIASTOLIC BLOOD PRESSURE: 60 MMHG | SYSTOLIC BLOOD PRESSURE: 129 MMHG

## 2020-10-19 VITALS — DIASTOLIC BLOOD PRESSURE: 81 MMHG | SYSTOLIC BLOOD PRESSURE: 129 MMHG

## 2020-10-19 VITALS — DIASTOLIC BLOOD PRESSURE: 92 MMHG | SYSTOLIC BLOOD PRESSURE: 138 MMHG

## 2020-10-19 VITALS — SYSTOLIC BLOOD PRESSURE: 133 MMHG | DIASTOLIC BLOOD PRESSURE: 73 MMHG

## 2020-10-19 LAB
ALBUMIN SERPL-MCNC: 2.5 G/DL (ref 3.4–5)
ALP SERPL-CCNC: 234 U/L (ref 46–116)
ALT SERPL-CCNC: 65 U/L (ref 30–65)
ANION GAP SERPL CALC-SCNC: 9 MMOL/L (ref 7–16)
AST SERPL-CCNC: 231 U/L (ref 15–37)
BILIRUB SERPL-MCNC: 5.8 MG/DL
BUN SERPL-MCNC: 6 MG/DL (ref 7–18)
CALCIUM SERPL-MCNC: 7.6 MG/DL (ref 8.5–10.1)
CHLORIDE SERPL-SCNC: 101 MMOL/L (ref 98–107)
CO2 SERPL-SCNC: 26 MMOL/L (ref 21–32)
CREAT SERPL-MCNC: 0.9 MG/DL (ref 0.6–1.3)
GLUCOSE SERPL-MCNC: 115 MG/DL (ref 70–99)
HCT VFR BLD CALC: 24.2 % (ref 42–52)
HGB BLD-MCNC: 8.4 GM/DL (ref 14–18)
INR PPP: 1.4
MAGNESIUM SERPL-MCNC: 1.5 MG/DL (ref 1.8–2.4)
MCH RBC QN AUTO: 34.5 PG (ref 26–34)
MCHC RBC AUTO-ENTMCNC: 34.6 G/DL (ref 28–37)
MCV RBC: 99.7 FL (ref 80–100)
MPV: 8.1 FL. (ref 7.2–11.1)
PLATELET COUNT*: 134 THOU/UL (ref 150–400)
POTASSIUM SERPL-SCNC: 3.6 MMOL/L (ref 3.5–5.1)
PROT SERPL-MCNC: 5.8 G/DL (ref 6.4–8.2)
PROTHROMBIN TIME: 13.9 SECONDS (ref 9.2–11.5)
RBC # BLD AUTO: 2.43 MIL/UL (ref 4.5–6)
RDW-CV: 16.4 % (ref 10.5–14.5)
SODIUM SERPL-SCNC: 136 MMOL/L (ref 136–145)
WBC # BLD AUTO: 7.7 THOU/UL (ref 4–11)

## 2020-10-19 NOTE — CON
[address]  [address]                    CONSULTATION                  
_______________________________________________________________________________
 
Name:       LIS BARNES                Room:           [address] [place]  
M.R.#:  [id number]      Account #:      [id number]  
Admission:  10/16/20     Attend Phys:    Lis Moise MD 
Discharge:               Date of Birth:  [birth year]  
         Report #: [id number]
                                                                     [id number]  
_______________________________________________________________________________
THIS REPORT FOR:  //name//                      
 
cc:  BRYAN Davis family physician/PCP 
     BRYAN - Susan family physician/PCP                                        ~
THIS REPORT FOR:  //name//                      
 
HISTORY OF PRESENT ILLNESS:  This is a 35-year-old male, with prior history of
alcoholic hepatitis and cirrhosis, who is presenting for evaluation of worsening
abdominal distention.  The patient also reports one episode of hematemesis
yesterday.  He denies previous episodes in the past.  He does report having
black colored stools for the last few days.  The patient was previously seen in
July of this year for his first episode of alcoholic hepatitis.  At that time,
he was recommended alcohol cessation, but he continues to drink about fifth of
vodka daily for his anxiety.
 
PAST MEDICAL HISTORY:  Anxiety, depression, PTSD, schizophrenia.
 
PAST SURGICAL HISTORY:  None.
 
SOCIAL HISTORY:  As mentioned above, the patient smokes about 5 cigarettes a day
and is daily drinker and drinks 1 pint to one-fifth of vodka daily and has a
remote history of recreational drug use in the past.
 
FAMILY HISTORY:  No family history of GI related malignancies.
 
REVIEW OF SYSTEMS:  Comprehensive 10-point review of systems is negative except
for what was mentioned in the HPI.
 
PHYSICAL EXAMINATION:
GENERAL:  The patient is alert, awake, oriented x 3.
HEENT:  Pupils are equal, round, reactive to light and accommodation.  Mucous
membranes are moist.  There is no congestion.
LUNGS:  Clear to auscultation bilaterally.
CARDIOVASCULAR:  Rate and rhythm regular, S1, S2 present.
ABDOMEN:  Soft.  There is no distention, guarding or rigidity.
EXTREMITIES:  Warm and well perfused.
 
LABORATORY DATA:  Hemoglobin 11.9, hematocrit 34.3.  INR 1.3.  Sodium 127,
potassium 3.4, bicarb 23, chloride 87, BUN 14, creatinine 0.9, total bilirubin
5.3, , ALT 51.
 
IMAGING:  CT abdomen and pelvis; geographic enhancement liver likely represents
fatty infiltration.  No focal masses, pattern unchanged, similar to CT scan on
07/20/2020, hepatosplenomegaly similar to what was seen on the prior CT scan. 
No evidence of extravasation or hemorrhage in the stomach, moderate sized hiatus
hernia, prominence of esophagus.
 
 
 
[place] [address]                    CONSULTATION                  
_______________________________________________________________________________
 
Name:       LIS BARNES                Room:           [address] [place]  
[facility]#:  [id number]      Account #:      [id number]  
Admission:  10/16/20     Attend Phys:    Lis Moise MD 
Discharge:               Date of Birth:  [birth year]  
         Report #: [id number]
                                                                     [id number]  
_______________________________________________________________________________
 
ASSESSMENT AND PLAN:  Pleasant 35-year-old male, with history of alcohol abuse,
who is presenting with hematemesis.  The patient has a history of cirrhosis and
varices.  Therefore, we will have to proceed with EGD for further evaluation. 
The patient needs to be put on Protonix and octreotide drip for a total of 72
hours.  Put the patient on IV Rocephin 1 gram daily.  The patient's discriminant
function is 15.3.  He does have alcoholic hepatitis from continued alcohol use,
I would hold off on any steroid use because of the ongoing GI bleed at this
time.  The patient has no evidence of encephalopathy at this time.  The
patient's ascites has significantly improved from the past.  
 
RECOMMENDATIONS:  Further recommendations will be based on results of the EGD. 
The patient was seen and examined on 10/16/2020 and this note reflects that date
of service.
 
 
 
 
 
 
 
 
 
 
 
 
 
 
 
 
 
 
 
 
 
 
 
 
 
 
 
 
 
 
<ELECTRONICALLY SIGNED>
                                        By:  Ramon Campo MD         
10/19/20     0951
D: 10/17/20 1030_______________________________________
T: 10/17/20 1048Ramon Campo MD            /nt

## 2020-10-20 VITALS — SYSTOLIC BLOOD PRESSURE: 119 MMHG | DIASTOLIC BLOOD PRESSURE: 72 MMHG

## 2020-10-20 VITALS — SYSTOLIC BLOOD PRESSURE: 123 MMHG | DIASTOLIC BLOOD PRESSURE: 71 MMHG

## 2020-10-20 VITALS — DIASTOLIC BLOOD PRESSURE: 71 MMHG | SYSTOLIC BLOOD PRESSURE: 116 MMHG

## 2020-10-20 VITALS — DIASTOLIC BLOOD PRESSURE: 85 MMHG | SYSTOLIC BLOOD PRESSURE: 131 MMHG

## 2020-10-20 VITALS — SYSTOLIC BLOOD PRESSURE: 120 MMHG | DIASTOLIC BLOOD PRESSURE: 81 MMHG

## 2020-10-20 VITALS — DIASTOLIC BLOOD PRESSURE: 50 MMHG | SYSTOLIC BLOOD PRESSURE: 99 MMHG

## 2020-10-20 LAB
ALBUMIN SERPL-MCNC: 2.4 G/DL (ref 3.4–5)
ALP SERPL-CCNC: 268 U/L (ref 46–116)
ALT SERPL-CCNC: 71 U/L (ref 30–65)
ANION GAP SERPL CALC-SCNC: 10 MMOL/L (ref 7–16)
AST SERPL-CCNC: 193 U/L (ref 15–37)
BILIRUB SERPL-MCNC: 6 MG/DL
BUN SERPL-MCNC: 7 MG/DL (ref 7–18)
CALCIUM SERPL-MCNC: 7.6 MG/DL (ref 8.5–10.1)
CHLORIDE SERPL-SCNC: 100 MMOL/L (ref 98–107)
CO2 SERPL-SCNC: 25 MMOL/L (ref 21–32)
CREAT SERPL-MCNC: 0.8 MG/DL (ref 0.6–1.3)
GLUCOSE SERPL-MCNC: 111 MG/DL (ref 70–99)
HCT VFR BLD CALC: 24.9 % (ref 42–52)
HGB BLD-MCNC: 8.6 GM/DL (ref 14–18)
MAGNESIUM SERPL-MCNC: 1.6 MG/DL (ref 1.8–2.4)
MCH RBC QN AUTO: 34.7 PG (ref 26–34)
MCHC RBC AUTO-ENTMCNC: 34.5 G/DL (ref 28–37)
MCV RBC: 100.5 FL (ref 80–100)
MPV: 7.8 FL. (ref 7.2–11.1)
PLATELET COUNT*: 166 THOU/UL (ref 150–400)
POTASSIUM SERPL-SCNC: 3.4 MMOL/L (ref 3.5–5.1)
PROT SERPL-MCNC: 5.8 G/DL (ref 6.4–8.2)
RBC # BLD AUTO: 2.48 MIL/UL (ref 4.5–6)
RDW-CV: 16.2 % (ref 10.5–14.5)
SODIUM SERPL-SCNC: 135 MMOL/L (ref 136–145)
WBC # BLD AUTO: 8.3 THOU/UL (ref 4–11)

## 2020-10-21 VITALS — DIASTOLIC BLOOD PRESSURE: 68 MMHG | SYSTOLIC BLOOD PRESSURE: 111 MMHG

## 2020-10-21 VITALS — SYSTOLIC BLOOD PRESSURE: 123 MMHG | DIASTOLIC BLOOD PRESSURE: 82 MMHG

## 2020-10-21 VITALS — SYSTOLIC BLOOD PRESSURE: 117 MMHG | DIASTOLIC BLOOD PRESSURE: 74 MMHG

## 2020-10-21 VITALS — DIASTOLIC BLOOD PRESSURE: 82 MMHG | SYSTOLIC BLOOD PRESSURE: 123 MMHG

## 2020-10-21 LAB
ANION GAP SERPL CALC-SCNC: 9 MMOL/L (ref 7–16)
BUN SERPL-MCNC: 6 MG/DL (ref 7–18)
CALCIUM SERPL-MCNC: 8.2 MG/DL (ref 8.5–10.1)
CHLORIDE SERPL-SCNC: 101 MMOL/L (ref 98–107)
CO2 SERPL-SCNC: 26 MMOL/L (ref 21–32)
CREAT SERPL-MCNC: 0.7 MG/DL (ref 0.6–1.3)
GLUCOSE SERPL-MCNC: 91 MG/DL (ref 70–99)
HCT VFR BLD CALC: 26.3 % (ref 42–52)
HGB BLD-MCNC: 9 GM/DL (ref 14–18)
MAGNESIUM SERPL-MCNC: 1.5 MG/DL (ref 1.8–2.4)
MCH RBC QN AUTO: 34.3 PG (ref 26–34)
MCHC RBC AUTO-ENTMCNC: 34.2 G/DL (ref 28–37)
MCV RBC: 100.4 FL (ref 80–100)
MPV: 7.8 FL. (ref 7.2–11.1)
PLATELET COUNT*: 193 THOU/UL (ref 150–400)
POTASSIUM SERPL-SCNC: 3.6 MMOL/L (ref 3.5–5.1)
RBC # BLD AUTO: 2.62 MIL/UL (ref 4.5–6)
RDW-CV: 16.8 % (ref 10.5–14.5)
SODIUM SERPL-SCNC: 136 MMOL/L (ref 136–145)
WBC # BLD AUTO: 8.1 THOU/UL (ref 4–11)